# Patient Record
(demographics unavailable — no encounter records)

---

## 2025-01-26 NOTE — HISTORY OF PRESENT ILLNESS
[Spouse] : spouse [FreeTextEntry1] :  cpe  [de-identified] : diet; healthy exercise; 150 minutes/week but weight loss plateaus  interested in quitting marijuana use - smokes marijauna in the evening which "helps him relax" - denies depression - mild anxiety on ANA-7 - in the past was able to quit for  1 month - is interested if any po med to assist with him quitting daxjauan  PULM - has PIERCE - has not followed with pulm for PIERCE machine  GI issues resolved - but last year had BRBPR - had negative FIT test - denies any current GI issues  HLD - stopped atorvastatin - ran out & self d/c'd statin

## 2025-01-26 NOTE — ASSESSMENT
[FreeTextEntry1] : 34M c obesity, elevated bp, PIERCE, history of leukocytosis saw hematology, history of b12 deficiency, HLD here for cpe and treatment of obesity, diagnosis of HTN,  management of HLD   GHM - check fasting BW  physical ecg performed - nsr utd with optho and dental exam  advised GI consult- no acute complaints but h/o of BRBPR last year w/negative FIT test  advised ASHD screening - to go for CT Calcium score (d/w pt risks and benefits) - pt will pay out of pocket for test states had flu shot declined further covid vaccine  rtc in 1month after starting anti-obesity med f/u in 3months for bp check

## 2025-01-26 NOTE — PHYSICAL EXAM
[No Acute Distress] : no acute distress [Well Nourished] : well nourished [PERRL] : pupils equal round and reactive to light [Normal Oropharynx] : the oropharynx was normal [Normal TMs] : both tympanic membranes were normal [Normal Nasal Mucosa] : the nasal mucosa was normal [No Lymphadenopathy] : no lymphadenopathy [Supple] : supple [Thyroid Normal, No Nodules] : the thyroid was normal and there were no nodules present [No Accessory Muscle Use] : no accessory muscle use [Clear to Auscultation] : lungs were clear to auscultation bilaterally [Regular Rhythm] : with a regular rhythm [Normal S1, S2] : normal S1 and S2 [No Murmur] : no murmur heard [No Edema] : there was no peripheral edema [Soft] : abdomen soft [Non Tender] : non-tender [Non-distended] : non-distended [No Masses] : no abdominal mass palpated [No HSM] : no HSM [Normal Bowel Sounds] : normal bowel sounds [Scrotum] : the scrotum was normal [Testes Tenderness] : no tenderness of the testes [Testes Mass (___cm)] : there were no testicular masses [Normal Supraclavicular Nodes] : no supraclavicular lymphadenopathy [Normal Posterior Cervical Nodes] : no posterior cervical lymphadenopathy [Normal Anterior Cervical Nodes] : no anterior cervical lymphadenopathy [Normal Affect] : the affect was normal [Normal Insight/Judgement] : insight and judgment were intact [de-identified] : normal color and pigmentation of nevi

## 2025-01-26 NOTE — HISTORY OF PRESENT ILLNESS
[Spouse] : spouse [FreeTextEntry1] :  cpe  [de-identified] : diet; healthy exercise; 150 minutes/week but weight loss plateaus  interested in quitting marijuana use - smokes marijauna in the evening which "helps him relax" - denies depression - mild anxiety on ANA-7 - in the past was able to quit for  1 month - is interested if any po med to assist with him quitting daxjauan  PULM - has PIERCE - has not followed with pulm for PIERCE machine  GI issues resolved - but last year had BRBPR - had negative FIT test - denies any current GI issues  HLD - stopped atorvastatin - ran out & self d/c'd statin

## 2025-01-26 NOTE — PHYSICAL EXAM
[No Acute Distress] : no acute distress [Well Nourished] : well nourished [PERRL] : pupils equal round and reactive to light [Normal Oropharynx] : the oropharynx was normal [Normal TMs] : both tympanic membranes were normal [Normal Nasal Mucosa] : the nasal mucosa was normal [No Lymphadenopathy] : no lymphadenopathy [Supple] : supple [Thyroid Normal, No Nodules] : the thyroid was normal and there were no nodules present [No Accessory Muscle Use] : no accessory muscle use [Clear to Auscultation] : lungs were clear to auscultation bilaterally [Regular Rhythm] : with a regular rhythm [Normal S1, S2] : normal S1 and S2 [No Murmur] : no murmur heard [No Edema] : there was no peripheral edema [Soft] : abdomen soft [Non Tender] : non-tender [Non-distended] : non-distended [No Masses] : no abdominal mass palpated [No HSM] : no HSM [Normal Bowel Sounds] : normal bowel sounds [Scrotum] : the scrotum was normal [Testes Tenderness] : no tenderness of the testes [Testes Mass (___cm)] : there were no testicular masses [Normal Supraclavicular Nodes] : no supraclavicular lymphadenopathy [Normal Posterior Cervical Nodes] : no posterior cervical lymphadenopathy [Normal Anterior Cervical Nodes] : no anterior cervical lymphadenopathy [Normal Affect] : the affect was normal [Normal Insight/Judgement] : insight and judgment were intact [de-identified] : normal color and pigmentation of nevi

## 2025-01-26 NOTE — REVIEW OF SYSTEMS
[Negative] : Neurological [FreeTextEntry4] : +nasal congestion despite Flonase  [FreeTextEntry6] : did not follow up with sleep medicine to get cpap machine